# Patient Record
Sex: MALE | Race: BLACK OR AFRICAN AMERICAN | Employment: FULL TIME | URBAN - METROPOLITAN AREA
[De-identification: names, ages, dates, MRNs, and addresses within clinical notes are randomized per-mention and may not be internally consistent; named-entity substitution may affect disease eponyms.]

---

## 2023-10-14 ENCOUNTER — APPOINTMENT (OUTPATIENT)
Dept: CT IMAGING | Facility: HOSPITAL | Age: 63
DRG: 065 | End: 2023-10-14
Payer: COMMERCIAL

## 2023-10-14 ENCOUNTER — HOSPITAL ENCOUNTER (INPATIENT)
Facility: HOSPITAL | Age: 63
LOS: 2 days | Discharge: NURSING, REHAB OR LONG-TERM CARE | DRG: 065 | End: 2023-10-17
Attending: EMERGENCY MEDICINE | Admitting: HOSPITALIST
Payer: COMMERCIAL

## 2023-10-14 ENCOUNTER — APPOINTMENT (OUTPATIENT)
Dept: CT IMAGING | Facility: HOSPITAL | Age: 63
DRG: 065 | End: 2023-10-14
Attending: EMERGENCY MEDICINE
Payer: COMMERCIAL

## 2023-10-14 DIAGNOSIS — M48.02 CERVICAL STENOSIS OF SPINAL CANAL: ICD-10-CM

## 2023-10-14 DIAGNOSIS — G45.9 BRAIN TIA: Primary | ICD-10-CM

## 2023-10-14 LAB
ANION GAP SERPL CALC-SCNC: 8 MMOL/L (ref 0–18)
BASOPHILS # BLD AUTO: 0.05 X10(3) UL (ref 0–0.2)
BASOPHILS NFR BLD AUTO: 0.7 %
BUN BLD-MCNC: 13 MG/DL (ref 7–18)
BUN/CREAT SERPL: 11.4 (ref 10–20)
CALCIUM BLD-MCNC: 9.6 MG/DL (ref 8.5–10.1)
CHLORIDE SERPL-SCNC: 104 MMOL/L (ref 98–112)
CO2 SERPL-SCNC: 31 MMOL/L (ref 21–32)
CREAT BLD-MCNC: 1.14 MG/DL
DEPRECATED RDW RBC AUTO: 46.3 FL (ref 35.1–46.3)
EGFRCR SERPLBLD CKD-EPI 2021: 71 ML/MIN/1.73M2 (ref 60–?)
EOSINOPHIL # BLD AUTO: 0.09 X10(3) UL (ref 0–0.7)
EOSINOPHIL NFR BLD AUTO: 1.3 %
ERYTHROCYTE [DISTWIDTH] IN BLOOD BY AUTOMATED COUNT: 13.8 % (ref 11–15)
GLUCOSE BLD-MCNC: 140 MG/DL (ref 70–99)
GLUCOSE BLDC GLUCOMTR-MCNC: 128 MG/DL (ref 70–99)
HCT VFR BLD AUTO: 44 %
HGB BLD-MCNC: 14.2 G/DL
IMM GRANULOCYTES # BLD AUTO: 0.02 X10(3) UL (ref 0–1)
IMM GRANULOCYTES NFR BLD: 0.3 %
INR BLD: 0.93 (ref 0.8–1.2)
LYMPHOCYTES # BLD AUTO: 3.08 X10(3) UL (ref 1–4)
LYMPHOCYTES NFR BLD AUTO: 45.4 %
MCH RBC QN AUTO: 29.1 PG (ref 26–34)
MCHC RBC AUTO-ENTMCNC: 32.3 G/DL (ref 31–37)
MCV RBC AUTO: 90.2 FL
MONOCYTES # BLD AUTO: 0.56 X10(3) UL (ref 0.1–1)
MONOCYTES NFR BLD AUTO: 8.2 %
NEUTROPHILS # BLD AUTO: 2.99 X10 (3) UL (ref 1.5–7.7)
NEUTROPHILS # BLD AUTO: 2.99 X10(3) UL (ref 1.5–7.7)
NEUTROPHILS NFR BLD AUTO: 44.1 %
OSMOLALITY SERPL CALC.SUM OF ELEC: 298 MOSM/KG (ref 275–295)
PLATELET # BLD AUTO: 216 10(3)UL (ref 150–450)
POTASSIUM SERPL-SCNC: 3.3 MMOL/L (ref 3.5–5.1)
PROTHROMBIN TIME: 13.1 SECONDS (ref 11.6–14.8)
RBC # BLD AUTO: 4.88 X10(6)UL
SODIUM SERPL-SCNC: 143 MMOL/L (ref 136–145)
WBC # BLD AUTO: 6.8 X10(3) UL (ref 4–11)

## 2023-10-14 PROCEDURE — 70498 CT ANGIOGRAPHY NECK: CPT | Performed by: EMERGENCY MEDICINE

## 2023-10-14 PROCEDURE — 70450 CT HEAD/BRAIN W/O DYE: CPT

## 2023-10-14 PROCEDURE — 70496 CT ANGIOGRAPHY HEAD: CPT | Performed by: EMERGENCY MEDICINE

## 2023-10-15 ENCOUNTER — APPOINTMENT (OUTPATIENT)
Dept: CT IMAGING | Facility: HOSPITAL | Age: 63
DRG: 065 | End: 2023-10-15
Attending: Other
Payer: COMMERCIAL

## 2023-10-15 ENCOUNTER — APPOINTMENT (OUTPATIENT)
Dept: CV DIAGNOSTICS | Facility: HOSPITAL | Age: 63
DRG: 065 | End: 2023-10-15
Attending: Other
Payer: COMMERCIAL

## 2023-10-15 ENCOUNTER — APPOINTMENT (OUTPATIENT)
Dept: MRI IMAGING | Facility: HOSPITAL | Age: 63
DRG: 065 | End: 2023-10-15
Attending: EMERGENCY MEDICINE
Payer: COMMERCIAL

## 2023-10-15 PROBLEM — I63.9 ACUTE STROKE DUE TO ISCHEMIA (HCC): Status: ACTIVE | Noted: 2023-10-15

## 2023-10-15 PROBLEM — G45.9 BRAIN TIA: Status: ACTIVE | Noted: 2023-10-15

## 2023-10-15 PROBLEM — I63.59 ISCHEMIC CEREBRAL STROKE DUE TO INTRACRANIAL LARGE ARTERY ATHEROSCLEROSIS (HCC): Status: ACTIVE | Noted: 2023-10-15

## 2023-10-15 LAB
ANTIBODY SCREEN: NEGATIVE
ATRIAL RATE: 91 BPM
CHOLEST SERPL-MCNC: 159 MG/DL (ref ?–200)
ETHANOL SERPL-MCNC: <3 MG/DL (ref ?–3)
GLUCOSE BLDC GLUCOMTR-MCNC: 110 MG/DL (ref 70–99)
GLUCOSE BLDC GLUCOMTR-MCNC: 132 MG/DL (ref 70–99)
GLUCOSE BLDC GLUCOMTR-MCNC: 151 MG/DL (ref 70–99)
GLUCOSE BLDC GLUCOMTR-MCNC: 157 MG/DL (ref 70–99)
GLUCOSE BLDC GLUCOMTR-MCNC: 181 MG/DL (ref 70–99)
HDLC SERPL-MCNC: 37 MG/DL (ref 40–59)
LDLC SERPL CALC-MCNC: 94 MG/DL (ref ?–100)
NONHDLC SERPL-MCNC: 122 MG/DL (ref ?–130)
P AXIS: 54 DEGREES
P-R INTERVAL: 154 MS
Q-T INTERVAL: 402 MS
QRS DURATION: 88 MS
QTC CALCULATION (BEZET): 494 MS
R AXIS: -46 DEGREES
RH BLOOD TYPE: POSITIVE
RH BLOOD TYPE: POSITIVE
T AXIS: 55 DEGREES
TRIGL SERPL-MCNC: 160 MG/DL (ref 30–149)
VENTRICULAR RATE: 91 BPM
VLDLC SERPL CALC-MCNC: 26 MG/DL (ref 0–30)

## 2023-10-15 PROCEDURE — 93306 TTE W/DOPPLER COMPLETE: CPT | Performed by: OTHER

## 2023-10-15 PROCEDURE — 70551 MRI BRAIN STEM W/O DYE: CPT | Performed by: EMERGENCY MEDICINE

## 2023-10-15 PROCEDURE — 70496 CT ANGIOGRAPHY HEAD: CPT | Performed by: OTHER

## 2023-10-15 PROCEDURE — 3E033XZ INTRODUCTION OF VASOPRESSOR INTO PERIPHERAL VEIN, PERCUTANEOUS APPROACH: ICD-10-PCS | Performed by: OTHER

## 2023-10-15 PROCEDURE — 70498 CT ANGIOGRAPHY NECK: CPT | Performed by: OTHER

## 2023-10-15 PROCEDURE — 05HD33Z INSERTION OF INFUSION DEVICE INTO RIGHT CEPHALIC VEIN, PERCUTANEOUS APPROACH: ICD-10-PCS | Performed by: OTHER

## 2023-10-15 RX ORDER — LABETALOL HYDROCHLORIDE 5 MG/ML
10 INJECTION, SOLUTION INTRAVENOUS EVERY 10 MIN PRN
Status: DISCONTINUED | OUTPATIENT
Start: 2023-10-15 | End: 2023-10-17

## 2023-10-15 RX ORDER — BISACODYL 10 MG
10 SUPPOSITORY, RECTAL RECTAL
Status: DISCONTINUED | OUTPATIENT
Start: 2023-10-15 | End: 2023-10-17

## 2023-10-15 RX ORDER — ONDANSETRON 2 MG/ML
4 INJECTION INTRAMUSCULAR; INTRAVENOUS EVERY 6 HOURS PRN
Status: DISCONTINUED | OUTPATIENT
Start: 2023-10-15 | End: 2023-10-17

## 2023-10-15 RX ORDER — ONDANSETRON 2 MG/ML
4 INJECTION INTRAMUSCULAR; INTRAVENOUS EVERY 6 HOURS PRN
Status: DISCONTINUED | OUTPATIENT
Start: 2023-10-15 | End: 2023-10-15

## 2023-10-15 RX ORDER — HEPARIN SODIUM 5000 [USP'U]/ML
5000 INJECTION, SOLUTION INTRAVENOUS; SUBCUTANEOUS EVERY 8 HOURS SCHEDULED
Status: DISCONTINUED | OUTPATIENT
Start: 2023-10-15 | End: 2023-10-17

## 2023-10-15 RX ORDER — NICOTINE POLACRILEX 4 MG
15 LOZENGE BUCCAL
Status: DISCONTINUED | OUTPATIENT
Start: 2023-10-15 | End: 2023-10-17

## 2023-10-15 RX ORDER — DEXTROSE MONOHYDRATE 25 G/50ML
50 INJECTION, SOLUTION INTRAVENOUS
Status: DISCONTINUED | OUTPATIENT
Start: 2023-10-15 | End: 2023-10-17

## 2023-10-15 RX ORDER — SODIUM CHLORIDE 9 MG/ML
INJECTION, SOLUTION INTRAVENOUS CONTINUOUS
Status: DISCONTINUED | OUTPATIENT
Start: 2023-10-15 | End: 2023-10-15

## 2023-10-15 RX ORDER — ENEMA 19; 7 G/133ML; G/133ML
1 ENEMA RECTAL ONCE AS NEEDED
Status: DISCONTINUED | OUTPATIENT
Start: 2023-10-15 | End: 2023-10-17

## 2023-10-15 RX ORDER — ASPIRIN 325 MG
325 TABLET ORAL DAILY
Status: DISCONTINUED | OUTPATIENT
Start: 2023-10-15 | End: 2023-10-17

## 2023-10-15 RX ORDER — HYDRALAZINE HYDROCHLORIDE 20 MG/ML
10 INJECTION INTRAMUSCULAR; INTRAVENOUS EVERY 2 HOUR PRN
Status: DISCONTINUED | OUTPATIENT
Start: 2023-10-15 | End: 2023-10-17

## 2023-10-15 RX ORDER — CHLORTHALIDONE 25 MG/1
25 TABLET ORAL DAILY
COMMUNITY

## 2023-10-15 RX ORDER — ATORVASTATIN CALCIUM 20 MG/1
20 TABLET, FILM COATED ORAL NIGHTLY
COMMUNITY
End: 2023-10-17

## 2023-10-15 RX ORDER — METOCLOPRAMIDE HYDROCHLORIDE 5 MG/ML
10 INJECTION INTRAMUSCULAR; INTRAVENOUS EVERY 8 HOURS PRN
Status: DISCONTINUED | OUTPATIENT
Start: 2023-10-15 | End: 2023-10-15

## 2023-10-15 RX ORDER — ACETAMINOPHEN 500 MG
500 TABLET ORAL EVERY 4 HOURS PRN
Status: DISCONTINUED | OUTPATIENT
Start: 2023-10-15 | End: 2023-10-17

## 2023-10-15 RX ORDER — POLYETHYLENE GLYCOL 3350 17 G/17G
17 POWDER, FOR SOLUTION ORAL DAILY PRN
Status: DISCONTINUED | OUTPATIENT
Start: 2023-10-15 | End: 2023-10-17

## 2023-10-15 RX ORDER — HEPARIN SODIUM 5000 [USP'U]/ML
5000 INJECTION, SOLUTION INTRAVENOUS; SUBCUTANEOUS EVERY 8 HOURS SCHEDULED
Status: DISCONTINUED | OUTPATIENT
Start: 2023-10-15 | End: 2023-10-15

## 2023-10-15 RX ORDER — CLOPIDOGREL BISULFATE 75 MG/1
75 TABLET ORAL ONCE
Status: COMPLETED | OUTPATIENT
Start: 2023-10-15 | End: 2023-10-15

## 2023-10-15 RX ORDER — CLOPIDOGREL BISULFATE 75 MG/1
75 TABLET ORAL DAILY
Status: DISCONTINUED | OUTPATIENT
Start: 2023-10-16 | End: 2023-10-17

## 2023-10-15 RX ORDER — METOCLOPRAMIDE HYDROCHLORIDE 5 MG/ML
10 INJECTION INTRAMUSCULAR; INTRAVENOUS EVERY 8 HOURS PRN
Status: DISCONTINUED | OUTPATIENT
Start: 2023-10-15 | End: 2023-10-17

## 2023-10-15 RX ORDER — ATORVASTATIN CALCIUM 80 MG/1
80 TABLET, FILM COATED ORAL NIGHTLY
Status: DISCONTINUED | OUTPATIENT
Start: 2023-10-15 | End: 2023-10-17

## 2023-10-15 RX ORDER — SENNOSIDES 8.6 MG
17.2 TABLET ORAL NIGHTLY PRN
Status: DISCONTINUED | OUTPATIENT
Start: 2023-10-15 | End: 2023-10-17

## 2023-10-15 RX ORDER — CLOPIDOGREL BISULFATE 75 MG/1
225 TABLET ORAL ONCE
Status: COMPLETED | OUTPATIENT
Start: 2023-10-15 | End: 2023-10-15

## 2023-10-15 RX ORDER — SODIUM CHLORIDE 9 MG/ML
INJECTION, SOLUTION INTRAVENOUS CONTINUOUS
Status: DISCONTINUED | OUTPATIENT
Start: 2023-10-15 | End: 2023-10-16

## 2023-10-15 RX ORDER — ASPIRIN 300 MG/1
300 SUPPOSITORY RECTAL DAILY
Status: DISCONTINUED | OUTPATIENT
Start: 2023-10-15 | End: 2023-10-17

## 2023-10-15 RX ORDER — NICOTINE POLACRILEX 4 MG
30 LOZENGE BUCCAL
Status: DISCONTINUED | OUTPATIENT
Start: 2023-10-15 | End: 2023-10-17

## 2023-10-15 RX ORDER — ACETAMINOPHEN 650 MG/1
650 SUPPOSITORY RECTAL EVERY 4 HOURS PRN
Status: DISCONTINUED | OUTPATIENT
Start: 2023-10-15 | End: 2023-10-17

## 2023-10-15 RX ORDER — ACETAMINOPHEN 325 MG/1
650 TABLET ORAL EVERY 4 HOURS PRN
Status: DISCONTINUED | OUTPATIENT
Start: 2023-10-15 | End: 2023-10-17

## 2023-10-15 RX ORDER — ASPIRIN 81 MG/1
324 TABLET, CHEWABLE ORAL ONCE
Status: COMPLETED | OUTPATIENT
Start: 2023-10-15 | End: 2023-10-15

## 2023-10-15 NOTE — PLAN OF CARE
Patient is A&Ox4, on room air. Norepinephrine gtt started. Wife at bedside. Left facial droop, some effort against gravity with Left leg, and unable to move Left arm. Bed is locked and in lowest position. Safety measures maintained. Call light is within reach.   Problem: Patient Centered Care  Goal: Patient preferences are identified and integrated in the patient's plan of care  Description: Interventions:  - What would you like us to know as we care for you?   - Provide timely, complete, and accurate information to patient/family  - Incorporate patient and family knowledge, values, beliefs, and cultural backgrounds into the planning and delivery of care  - Encourage patient/family to participate in care and decision-making at the level they choose  - Honor patient and family perspectives and choices  Outcome: Progressing     Problem: Patient/Family Goals  Goal: Patient/Family Long Term Goal  Description: Patient's Long Term Goal: to go home    Interventions:  - follow MD orders  - follow medication regimen  - See additional Care Plan goals for specific interventions  Outcome: Progressing  Goal: Patient/Family Short Term Goal  Description: Patient's Short Term Goal:     Interventions:   -   - See additional Care Plan goals for specific interventions  Outcome: Progressing

## 2023-10-15 NOTE — ED INITIAL ASSESSMENT (HPI)
Pt from home to ED via triage for evaluation of left sided weakness. Onset 1 hr PTA to ED. Pt reports left arm weakness and balance being off.

## 2023-10-15 NOTE — PROGRESS NOTES
Brief neurology progress note    No improvement with pressors/blood pressure augmentation. Recommend discontinuing norepinephrine. Once he is off norepinephrine then can transfer back to the floor. q4-hour neurochecks. q4 hour vitals. Patient will be seen by neurosurgery tomorrow. Already approved by Skip Orellana. Would like neurosurgery evaluation before he goes to hospitals. may be able to go to hospitals tomorrow. Okay for acute rehab per neurology.     Tana Wise,   Staff Vascular & General Neurology

## 2023-10-15 NOTE — ED QUICK NOTES
Orders for admission, patient is aware of plan and ready to go upstairs. Any questions, please call ED RN logan at extension [phone].      Patient Covid vaccination status: Unvaccinated     COVID Test Ordered in ED: None    COVID Suspicion at Admission: N/A    Running Infusions:  None    Mental Status/LOC at time of transport: alert    Other pertinent information:   CIWA score: N/A   NIH score:  0

## 2023-10-15 NOTE — PROGRESS NOTES
Brief neurology pn     Patient initially presented with left hemiparesis and hemisensory loss that completely resolved. His symptoms fluctuated and returned in the emergency department. Per discussion with the ED attending the patient presented with symptoms that had resolved and then spontaneously returned. He had a left facial droop and left pronator drift. Tenecteplase was discussed but his NIH was a 2 and the patient declined treatment tenecteplase. He then had an MRI of the brain, see below, which demonstrated a complete left periventricular stroke, based on location and morphology most likely due to small vessel disease. Patient's left hemiparesis (brachial >crural) worsened/continued to progress at 4:35 AM.  Spoke with RN. Systolic blood pressures in the 140s. Started patient on normal saline at 125. Blood sugar is euglycemic and greater than 100. Recommended stat CTA head and neck, but prior CTA without any LVO involving his right MCA territory. Not a candidate for thrombectomy because no large vessel occlusion. Appears patient is having a striatocapsular stroke due to small vessel disease. In these strokes a branch vessel off the parent vessel (right MCA) has been occluded. There may be variable flow leading to fluctuating deficits. Or, his stroke may gradually be expanding. Unfortunately there are no interventions available and the strokes can spontaneously fluctuate. This is the natural history of the disease. Spoke with the emergency department attending. tenecteplase was discussed with the patient when he declined in the ER. Per their discussion the patient declined treatment with tenecteplase at that time because his NIH was a 2. We will follow-up CTA. We will transfer to the unit for a trial of pressors and closer monitoring to see if he has any improvement with higher blood pressures. Full consult to follow.     Melody Lebron DO  Staff Vascular & General Neurology

## 2023-10-15 NOTE — SLP NOTE
ADULT SWALLOWING EVALUATION    ASSESSMENT    ASSESSMENT/OVERALL IMPRESSION:  PPE REQUIRED. THIS THERAPIST WORE GLOVES, DROPLET MASK, AND GOGGLES FOR DURATION OF EVALUATION. HANDS WASHED UPON ENTRANCE/EXIT. SLP BSSE orders received and acknowledged. A swallow evaluation warranted secondary to stroke protocol. Pt afebrile with clear vocal quality, on room air, with oxygen saturation at 98. Pt with no prior hx of dysphagia at [address] [place] [address]. Pt positioned upright in bed. Pt with no complaints of pain. Oral [facility] exam completed and L sided facial droop with L sided facial/lingual weakness observed. Pt with adequate oral acceptance. Intermittent anterior spillage from L side of mouth with all trials. Pt with intact bite, mastication of solids, and slightly increased JAZMYNE. Pt's swallow response appears delayed with reduced hyolaryngeal elevation/excursion. No clinical signs of aspiration (e.g., immediate/delayed throat clear, immediate/delayed cough, wet vocal quality, increased O2 effort) observed across all trials of mildly thick liquids, puree, and hard solids. Overt signs/symptoms of aspiration observed with thin liquids as evidenced by throat clearing and wet vocal quality. Pt expressed thin liquids feel \"fast\" going down. Oral/buccal cavities clear of residue following all trials. Oxygen status remained >96 t/o the entire evaluation. At this time, pt presents with mild oral dysphagia and probable pharyngeal dysfunction. Recommend a regular diet and mildly thick liquids with strict adherence to safe swallowing compensatory strategies. Results and recommendations reviewed with RN and pt. Pt v/u to all results/recommendations. Recommendations remain written on whiteboard. SLP collaborated with RN for MD diet orders. PLAN: SLP to f/u x3 meal assessments, monitor imaging, VFSS if clinically warranted, and SLE in future session.      RECOMMENDATIONS   Diet Recommendations - Solids: Regular  Diet Recommendations - Liquids: Nectar thick liquids/ Mildly thick      Compensatory Strategies Recommended: No straws; Slow rate; Alternate consistencies;Small bites and sips  Aspiration Precautions: Upright position; Slow rate;Small bites and sips; No straw  Medication Administration Recommendations: Present with thickened liquid; One pill at a time  Treatment Plan/Recommendations: Aspiration precautions  Discharge Recommendations/Plan: Undetermined    HISTORY   MEDICAL HISTORY  Reason for Referral: Stroke protocol    Problem List  Principal Problem:    Ischemic cerebral stroke due to intracranial large artery atherosclerosis ([facility] [address]  Active Problems:    Acute stroke due to ischemia [facility])      Past Medical History  Past Medical History:   Diagnosis Date    High blood pressure     High cholesterol     Prediabetes        Prior Living Situation: Home with spouse  Diet Prior to Admission: Regular; Thin liquids  Precautions: Aspiration    Patient/Family Goals: Pt expressed he wants to live    SWALLOWING HISTORY  Current Diet Consistency: NPO  Dysphagia History: None at [facility]    Imaging Results:     MRI BRAIN 10/15/23:  CONCLUSION:   1. Acute 2 cm deep white matter periventricular infarct along body of right lateral ventricle. Findings are in a small vessel disease distribution. 2. Mild changes of chronic small vessel disease in cerebral white matter. 3. 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 Please see CTA for further details. Dictated by (CST): Ofelia Ashley MD on 10/15/2023 at 11:10 AM       Finalized by (CST): Ofelia Ashley MD on 10/15/2023 at 11:16 AM     OBJECTIVE   ORAL MOTOR EXAMINATION  Dentition: Natural;Functional  Symmetry: Reduced left facial  Strength: Reduced left facial;Reduced left lingual     Range of Motion: Within Functional Limits  Rate of Motion: Reduced    Voice Quality: Clear  Respiratory Status: Unlabored  Consistencies Trialed:  Thin liquids; Nectar thick liquids;Puree;Hard solid  Method of Presentation: Self presentation;Spoon;Cup;Single sips  Patient Positioning: Upright;Midline    Oral Phase of Swallow: Impaired  Bolus Retrieval: Intact  Bilabial Seal: Impaired  Bolus Formation: Impaired  Bolus Propulsion: Impaired  Mastication: Intact       Pharyngeal Phase of Swallow: Impaired  Laryngeal Elevation Timing: Appears impaired  Laryngeal Elevation Strength: Appears impaired     (Please note: Silent aspiration cannot be evaluated clinically. Videofluoroscopic Swallow Study is required to rule-out silent aspiration.)    Esophageal Phase of Swallow: No complaints consistent with possible esophageal involvement    GOALS  Goal #1 The patient will tolerate regular consistency and mildly thick liquids without overt signs or symptoms of aspiration with 100 % accuracy over 1-2 session(s). In Progress   Goal #2 The patient/family/caregiver will demonstrate understanding and implementation of aspiration precautions and swallow strategies independently over 3 session(s). In Progress   Goal #3 The patient will tolerate trial upgrade of regular consistency and thin liquids without overt signs or symptoms of aspiration with 100 % accuracy over 2-3 session(s). In Progress   Goal #4 SLE in future session. In Progress     FOLLOW UP  Treatment Plan/Recommendations: Aspiration precautions  Number of Visits to Meet Established Goals: 3  Follow Up Needed (Documentation Required): Yes  SLP Follow-up Date: 10/16/23    Thank you for your referral.   If you have any questions, please contact KRAEN Peterson Sparks  Speech Language Pathologist  Phone Number Ext. [phone]

## 2023-10-15 NOTE — CM/SW NOTE
MDO Likely needs Acute rehab, was inquiring about rehab in [facility] where his wife lives       PT/OT eval is pending. SW spoke with patient, introduced self and role. Patient alert and oriented at time of assessment. Patient lives with wife at (address correct on face sheet- [place], [place]). Prior to hospitalization, patient independent with ADL's. Patient is aware that he will need rehab and is in agreement, states that after speaking with the medical team, he would like to go to rehab closer to the hospital. SW discussed AR placement process, patient expressed understanding. Patient would like to look at a list of local AR options. SW to provide when avail.        PLAN: Pending PT/OT eval. Will need PMR if rec is Acute Rehab, Referrals pending in [place] for acute rehab, need to follow up with choice list*    Dano Mejia, MSW, LSW    [id number]

## 2023-10-15 NOTE — PLAN OF CARE
NIH 9 on daily scale, no changes from prior assessment, no changes with higher BP and increased perfusion with levo, left arm spasms with non voluntary movement, limited movement in left leg, full movement on right extremities, placed midline, pt tearful, consulted spiritual care, speech approved for diet, had issues with thin liquids so placed on thickened liquids with no straw, echo normal with EF of 60%-65%, aspirin and Plavix given, will start atorvastatin tonight,  levophed discontinued per neuro MD Wor[place], no further neuro interventions, pt approved [facility] acute rehab where  sent referral, consult to neurosurgery for mass on neck that will be addressed outpatient      Problem: Patient Centered Care  Goal: Patient preferences are identified and integrated in the patient's plan of care  Description: Interventions:  - What would you like us to know as we care for you?   - Provide timely, complete, and accurate information to patient/family  - Incorporate patient and family knowledge, values, beliefs, and cultural backgrounds into the planning and delivery of care  - Encourage patient/family to participate in care and decision-making at the level they choose  - Honor patient and family perspectives and choices  Outcome: Progressing

## 2023-10-15 NOTE — ED QUICK NOTES
Pt refusing \" clot busting medication\". Risks and benefits explained per Dr Franklin Schneider    Time gave to reconsider.

## 2023-10-15 NOTE — PROGRESS NOTES
10/15/23 0948   Clinical Encounter Type   Visited With Patient not available;Health care provider   Routine Visit Follow-up   Crisis Visit Critical care   Referral From Nurse   Family Spiritual Encounters   Family Participation in Care Often     Summary: received consult for family support. Writer attempted to visit with patient but they were busy with staff. Spiritual Care support can be requested via an Epic consult.    -Charlette Roberts.

## 2023-10-15 NOTE — ED QUICK NOTES
Family at bedside. C/O intermittent \" feels like my left side of my face is not moving\". No changes in neuro status. Waiting results.

## 2023-10-15 NOTE — PROGRESS NOTES
10/15/23 1622   Clinical Encounter Type   Visited With Patient   Routine Visit Follow-up   Crisis Visit Critical care   Referral From Nurse   Referral To    Restorationist Encounters   Spiritual Requests During Visit / Hospitalization Declines  Visit   Family Spiritual Encounters   Family Support During Treatment Often     Summary: received consult for visit. Writer visited with patient, seated in bed waiting for their meal. Patient requested  return another day for a visit.  Spiritual Care support can be requested via an [facility] consult.     -Beck Hammer

## 2023-10-16 PROBLEM — G45.9 BRAIN TIA: Status: ACTIVE | Noted: 2023-10-16

## 2023-10-16 PROBLEM — G93.89: Status: ACTIVE | Noted: 2023-10-16

## 2023-10-16 LAB
ANION GAP SERPL CALC-SCNC: 5 MMOL/L (ref 0–18)
BASOPHILS # BLD AUTO: 0.03 X10(3) UL (ref 0–0.2)
BASOPHILS NFR BLD AUTO: 0.5 %
BUN BLD-MCNC: 9 MG/DL (ref 7–18)
BUN/CREAT SERPL: 10.6 (ref 10–20)
CALCIUM BLD-MCNC: 8.5 MG/DL (ref 8.5–10.1)
CHLORIDE SERPL-SCNC: 109 MMOL/L (ref 98–112)
CO2 SERPL-SCNC: 29 MMOL/L (ref 21–32)
CREAT BLD-MCNC: 0.85 MG/DL
DEPRECATED RDW RBC AUTO: 47.2 FL (ref 35.1–46.3)
EGFRCR SERPLBLD CKD-EPI 2021: 96 ML/MIN/1.73M2 (ref 60–?)
EOSINOPHIL # BLD AUTO: 0.07 X10(3) UL (ref 0–0.7)
EOSINOPHIL NFR BLD AUTO: 1.1 %
ERYTHROCYTE [DISTWIDTH] IN BLOOD BY AUTOMATED COUNT: 13.9 % (ref 11–15)
GLUCOSE BLD-MCNC: 132 MG/DL (ref 70–99)
HCT VFR BLD AUTO: 41 %
HGB BLD-MCNC: 13 G/DL
IMM GRANULOCYTES # BLD AUTO: 0.01 X10(3) UL (ref 0–1)
IMM GRANULOCYTES NFR BLD: 0.2 %
LYMPHOCYTES # BLD AUTO: 2.26 X10(3) UL (ref 1–4)
LYMPHOCYTES NFR BLD AUTO: 36.5 %
MAGNESIUM SERPL-MCNC: 1.7 MG/DL (ref 1.6–2.6)
MCH RBC QN AUTO: 29.1 PG (ref 26–34)
MCHC RBC AUTO-ENTMCNC: 31.7 G/DL (ref 31–37)
MCV RBC AUTO: 91.7 FL
MONOCYTES # BLD AUTO: 0.39 X10(3) UL (ref 0.1–1)
MONOCYTES NFR BLD AUTO: 6.3 %
NEUTROPHILS # BLD AUTO: 3.44 X10 (3) UL (ref 1.5–7.7)
NEUTROPHILS # BLD AUTO: 3.44 X10(3) UL (ref 1.5–7.7)
NEUTROPHILS NFR BLD AUTO: 55.4 %
OSMOLALITY SERPL CALC.SUM OF ELEC: 297 MOSM/KG (ref 275–295)
PLATELET # BLD AUTO: 171 10(3)UL (ref 150–450)
POTASSIUM SERPL-SCNC: 3.1 MMOL/L (ref 3.5–5.1)
POTASSIUM SERPL-SCNC: 3.1 MMOL/L (ref 3.5–5.1)
POTASSIUM SERPL-SCNC: 3.3 MMOL/L (ref 3.5–5.1)
RBC # BLD AUTO: 4.47 X10(6)UL
SODIUM SERPL-SCNC: 143 MMOL/L (ref 136–145)
WBC # BLD AUTO: 6.2 X10(3) UL (ref 4–11)

## 2023-10-16 PROCEDURE — 99253 IP/OBS CNSLTJ NEW/EST LOW 45: CPT | Performed by: NEUROLOGICAL SURGERY

## 2023-10-16 RX ORDER — MAGNESIUM OXIDE 400 MG/1
400 TABLET ORAL ONCE
Status: COMPLETED | OUTPATIENT
Start: 2023-10-16 | End: 2023-10-16

## 2023-10-16 RX ORDER — POTASSIUM CHLORIDE 20 MEQ/1
40 TABLET, EXTENDED RELEASE ORAL EVERY 4 HOURS
Status: COMPLETED | OUTPATIENT
Start: 2023-10-16 | End: 2023-10-16

## 2023-10-16 NOTE — PHYSICAL THERAPY NOTE
PHYSICAL THERAPY EVALUATION - INPATIENT     Room Number: 499/393-B  Evaluation Date: 10/16/2023  Type of Evaluation: Initial   Physician Order: PT Eval and Treat    Presenting Problem: L sided weakness CVA  Reason for Therapy: Mobility Dysfunction and Discharge Planning    PHYSICAL THERAPY ASSESSMENT   Orders received and chart reviewed. ALESHA Arvizu approved participation in physical therapy. Session coordinated with OT, gait belt donned. PPE worn by therapist: mask and gloves. Patient was not wearing a mask during session. Patient is a 70year old male admitted 10/14/2023 for Presenting Problem: L sided weakness CVA. Patient presented in bed with 0/10 pain. Education provided on Physical therapy plan of care and physiological benefits of out of bed mobility. Patient with good carryover. Patient with little to no movement noted in left leg, patient with 2/5 muscle activation in left adductors of thigh. This appears to be voluntary movement, tone noted when patient standing. Patient agreeable to transfer to bedside chair. Patient with mod A x 1 for bed mobility. Patient with max A x 2 for stand pivot transfer to bedside chair. Patient able to stand from bedside chair with max A x 2, assist provided to hold left hand on rolling walker. Patient also with tactile facilitation at left leg while weight shifting from left to right for about 1 minutes with mod to max A x 2. Patient with no buckling noted due to facilitation and does not appear to be hyperextending, may benefit from knee immobilizer for left leg. Patient is currently functioning below baseline with bed mobility, transfers, gait, and stair negotiation as a result of the following impairments: decreased functional strength, impaired coordination, impaired motor planning, and medical status. Next session anticipate to progress bed mobility, transfers, gait, and stair negotiation. Oxygen sat 97% and heart rate 83 on room air. Blood pressure 156/81.  Oxygen sat at end of session 97% and heart rate 72. Patient history and/or personal factors that may impact the plan of care include home accessibility concerns. Based on the physical therapy examination of the noted systems and functional activity/participation limitations, the patient presentation is unstable given the patient demonstrates worsening of previously stable condition and demonstrates worsening of co-morbidities. Patient would benefit from continued skilled physical therapy interventions to maximize patient safety and functional independence. Updated nurse on results of session, patient left in bedside chair, all lines intact, all needs met with call light in reach. Bed mobility: Mod assist  Transfers: Max assist and assist of 2  Gait Assistance: Not tested                   Patient was left in bedside chair at end of session with all needs in reach. Patient's current functional deficits include bed mobility, transfers, gait and stair navigation. Patient does not have the physical skills to return to prior living environment upon D/C from the hospital. Anticipate patient will benefit from continued skilled physical therapy while in the hospital and upon D/C from the hospital at acute rehab facility. The patient's Approx Degree of Impairment: 72.57% has been calculated based on documentation in the [facility] '6 clicks' Inpatient Basic Mobility Short Form. Research supports that patients with this level of impairment may benefit from Acute Rehab. RN aware of patient status post session. Patient will benefit from continued IP PT services to address these deficits in preparation for discharge. 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    Past Medical History  Past Medical History:   Diagnosis Date    High blood pressure     High cholesterol     Prediabetes      Past Surgical History  Past Surgical History:   Procedure Laterality Date    CERVICAL SPINE SURGERY      VASECTOMY       HOME SITUATION  Type of Home: House   Home Layout: One level  Stairs to Enter : 0  Railing: No  Stairs to Bedroom: 0  Railing: No  Lives With: Spouse  Drives: Yes  Patient Owned Equipment: None  Patient Regularly Uses: None    Prior Level of [place]: Patient reports being independent in ADL's and ambulation with no assistive device prior to admission to the hospital.     SUBJECTIVE  \"I was at dinner with my wife\"    PHYSICAL THERAPY EXAMINATION     OBJECTIVE  Precautions: Bed/chair alarm;Limb alert - left  Fall Risk: High fall risk    WEIGHT BEARING RESTRICTION  Weight Bearing Restriction: None                PAIN ASSESSMENT  Ratin          COGNITION  Overall Cognitive Status:  WFL - within functional limits    RANGE OF MOTION AND STRENGTH ASSESSMENT    Lower extremity ROM is within functional limits  RLE WNL    Lower extremity strength is within functional limits  RLE WNL    BALANCE  Static Sitting: Fair +  Dynamic Sitting: Fair  Static Standing: Not tested  Dynamic Standing: Not tested    AM-PAC '6-Clicks' INPATIENT SHORT FORM - BASIC MOBILITY  How much difficulty does the patient currently have. ..   Patient Difficulty: Turning over in bed (including adjusting bedclothes, sheets and blankets)?: A Lot   Patient Difficulty: Sitting down on and standing up from a chair with arms (e.g., wheelchair, bedside commode, etc.): A Lot   Patient Difficulty: Moving from lying on back to sitting on the side of the bed?: A Lot   How much help from another person does the patient currently need. .. Help from Another: Moving to and from a bed to a chair (including a wheelchair)?: A Lot   Help from Another: Need to walk in hospital room?: A Lot   Help from Another: Climbing 3-5 steps with a railing?: Total     AM-PAC Score:  Raw Score: 11   Approx Degree of Impairment: 72.57%   Standardized Score (AM-PAC Scale): 33.86   CMS Modifier (G-Code): CL    Exercise/Education Provided:  Bed mobility  Body mechanics  Transfer training    Patient End of Session: Up in chair;Needs met;Call light within reach;RN aware of session/findings; All patient questions and concerns addressed    CURRENT GOALS  Goals to be met by: 10/25/23  Patient Goal Patient's self-stated goal is: to go home   Goal #1 Patient is able to demonstrate supine - sit EOB @ level: minimum assistance     Goal #1   Current Status    Goal #2 Patient is able to demonstrate transfers Sit to/from Stand at assistance level: minimum assistance with walker - rolling     Goal #2  Current Status    Goal #3 Patient is able to ambulate 10 feet with assist device: walker - rolling at assistance level: minimum assistance   Goal #3   Current Status    Goal #4    Goal #4   Current Status    Goal #5 Patient to demonstrate independence with home activity/exercise instructions provided to patient in preparation for discharge.    Goal #5   Current Status    Goal #6    Goal #6  Current Status     Patient Evaluation Complexity Level:  History Moderate - 1 or 2 personal factors and/or co-morbidities   Examination of body systems Moderate - addressing a total of 3 or more elements   Clinical Presentation Moderate - Evolving   Clinical Decision Making Moderate Complexity     Therapeutic Activity: 13 minutes  Therapeutic Exercise: 10 minutes

## 2023-10-16 NOTE — DIABETES ED
[facility]   Diabetes Education Note      [address] Patient Status Inpatient   8/15/1960  MRN [id number]  Location  [facility] 2W/SW  Attending Debbie Mujica MD  Hospital Days # 1  PCP  None Pcp    Reason for Visit: Newly Diagnosed      HgbA1C (no units)   Date Value   10/16/2023      Comment:     Sent to [facility] for testing. Discussion:Order for new diagnosis of diabetes. No A1c value available. Patient and spouse indicates he has been told he has pre-diabetes however they are unsure of previous A1c value. He indicates he has eliminated regular lemonade from his meal planning. He typically consumes 3 meals per day and is aware of limiting portions of rice, potatoes and pasta. Reviewed meal planning for Diabetes located on the Diabetes education handouts. He places regular sugar in his coffee with breakfast ( 6 packets or 2 larger spoonfuls) daily. Encouraged him to limit or switch to sugar substitute for coffee in the morning. Discussed the benefits of glucose control to aid and prevent complications of diabetes. Reinforced the importance of regular follow up care in the outpatient setting. Declined education for use of glucometer and spouse confirmed she can assist as needed for monitoring blood sugar if he is directed upon discharge. Will continue to monitor and return as allowed prior to discharge.         Education provided:  Basic Diabetes pathophysiology  Basic Meal planning  Blood sugar target ranges  Benefits of physical activity for glucose control  Reinforced the importance of follow up care with outpatient medical team.      Ines Wise RN    10/16/2023  11:34 AM

## 2023-10-16 NOTE — PLAN OF CARE
Neuro status unchanged, L side hemiparesis  Tolerated pills with thickened water, no s/s of aspiration  Required assistance with turns but R side of body at baseline strength, PT/OT to see today      Problem: Patient Centered Care  Goal: Patient preferences are identified and integrated in the patient's plan of care  Description: Interventions:  - What would you like us to know as we care for you?  Is from out of town, here for work  - Provide timely, complete, and accurate information to patient/family  - Incorporate patient and family knowledge, values, beliefs, and cultural backgrounds into the planning and delivery of care  - Encourage patient/family to participate in care and decision-making at the level they choose  - Honor patient and family perspectives and choices  10/16/2023 0612 by Radha Crook RN  Outcome: Progressing  10/16/2023 0611 by Radha Crook RN  Outcome: Progressing     Problem: Patient/Family Goals  Goal: Patient/Family Long Term Goal  Description: Patient's Long Term Goal: regain function of L side    Interventions:  - discharge to acute rehab  - See additional Care Plan goals for specific interventions  10/16/2023 0612 by Radha Crook RN  Outcome: Progressing  10/16/2023 0611 by Radha Crook RN  Outcome: Progressing  Goal: Patient/Family Short Term Goal  Description: Patient's Short Term Goal: increased movement on L side and increased independence    Interventions:   - physical and occupational therapy consults  - See additional Care Plan goals for specific interventions  10/16/2023 0612 by Radha Crook RN  Outcome: Progressing  10/16/2023 0611 by Radha Crook RN  Outcome: Progressing     Problem: NEUROLOGICAL - ADULT  Goal: Achieves stable or improved neurological status  Description: INTERVENTIONS  - Assess for and report changes in neurological status  - Initiate measures to prevent increased intracranial pressure  - Maintain blood pressure and fluid volume within ordered parameters to optimize cerebral perfusion and minimize risk of hemorrhage  - Monitor temperature, glucose, and sodium.  Initiate appropriate interventions as ordered  Outcome: Progressing  Goal: Achieves maximal functionality and self care  Description: INTERVENTIONS  - Monitor swallowing and airway patency with patient fatigue and changes in neurological status  - Encourage and assist patient to increase activity and self care with guidance from PT/OT  - Encourage visually impaired, hearing impaired and aphasic patients to use assistive/communication devices  Outcome: Progressing

## 2023-10-16 NOTE — SLP NOTE
SPEECH DAILY NOTE - INPATIENT    ASSESSMENT & PLAN   ASSESSMENT  PPE REQUIRED. THIS THERAPIST WORE GLOVES, DROPLET MASK, AND GOGGLES FOR DURATION OF EVALUATION. HANDS WASHED UPON ENTRANCE/EXIT. SLP f/u for ongoing dysphagia tx/meal assessment per recommendations of regular/mildly thick per BSE. RN reports pt tolerates diet and medication well with no overt clinical s/s aspiration. Pt denies any swallowing challenges. Pt positioned upright in bedside chair with wife at bedside. Pt afebrile, tolerating room air with oxygen status 97 prior to the start of oral trials. SLP reviewed aspiration precautions and safe swallowing compensatory strategies with the patient. Safe swallow guidelines remain written on the white board in purple. Diet recommendations remain on the whiteboard in the room. Patient and family v/u. Provided no assistance, pt tolerates regular and mildly thick liquids via open cup with no overt clinical signs/symptoms of aspiration. Pt trialed with thin liquids and pt demonstrating slow/small sips and no CSA of aspiration for 100% of trials. Oxygen status remained >96 t/o the entire session. Respiratory Rate WFL. Oral/buccal cavities clear of residue following all trials. Pt upgraded to thin liquids (no straws). PLAN: SLP to f/u x1-2 meal assessments, monitor imaging, and VFSS if clinically warranted. Diet Recommendations - Solids: Regular  Diet Recommendations - Liquids: Thin Liquids    Compensatory Strategies Recommended: No straws; Slow rate; Alternate consistencies;Small bites and sips  Aspiration Precautions: Upright position; Slow rate;Small bites and sips; No straw  Medication Administration Recommendations: Present with thickened liquid; One pill at a time    Patient Experiencing Pain: No       Discharge Recommendations  Discharge Recommendations/Plan: Undetermined    Treatment Plan  Treatment Plan/Recommendations: Aspiration precautions; Dysarthria therapy    Interdisciplinary Communication: Discussed with RN  Recommendations posted at bedside      GOALS  Goal #1 The patient will tolerate regular consistency and mildly thick liquids without overt signs or symptoms of aspiration with 100 % accuracy over 1-2 session(s). Pt tolerates regular/mildly thick liquids with no CSA for 100% of trials. Upgraded to thin    The patient will tolerate regular consistency and thin thick liquids without overt signs or symptoms of aspiration with 100 % accuracy over 1-2 session(s). Goal initiated 10/16 Goal met/modified   Goal #2 The patient/family/caregiver will demonstrate understanding and implementation of aspiration precautions and swallow strategies independently over 3 session(s). Education provided regarding aspiration precautions and safe swallow strategies. Pt v/u to all recommendations. Continue. In Progress   Goal #3 The patient will tolerate trial upgrade of regular consistency and thin liquids without overt signs or symptoms of aspiration with 100 % accuracy over 2-3 session(s). Pt tolerated trials of thin liquids with no CSA for 100% of trials. Upgraded to thin liquids. Goal met    Goal #4 SLE in future session. Met Goal met     FOLLOW UP  Follow Up Needed (Documentation Required): Yes  SLP Follow-up Date: 10/17/23  Number of Visits to Meet Established Goals: 2    Session: 1 following BSE    If you have any questions, please contact Harry Dejesus, SLP    Hiral Lawrence  Speech Language Pathologist  Phone Number Ext. [phone]

## 2023-10-16 NOTE — SLP NOTE
SPEECH/LANGUAGE/COGNITIVE EVALUATION - INPATIENT    Admission Date: 10/14/2023  Evaluation Date: 10/16/23    Reason for Referral: Stroke protocol    ASSESSMENT & PLAN   ASSESSMENT & IMPRESSION  PPE REQUIRED. THIS THERAPIST WORE GLOVES, DROPLET MASK, AND GOGGLES FOR DURATION OF EVALUATION. HANDS WASHED UPON ENTRANCE/EXIT. In regards to S/L cognitive skills, pt reports slurred speech with difficulty articulating. Oral agility and verbal agility tasks appear impaired per informal measures. The SLUMS was administered in today's evaluation. Pt presents with intact receptive/expressive language skills characterized by his ability to respond to y/n questions, follow 1 step commands, follow written directions, provide correct responses to wh-questions following a short story, intact generative naming, and no evidence of word finding across conversational tasks. Pt oriented to person, time, place, and year. Pt exhibits overall intact immediate memory and short term memory skills as evidenced by his ability to recall 4/5 items post 3 minutes. Clock drawing appears functional with adequate placement of numbers and hands set to the correct time. PLAN: SLP to f/u to target OME/DDK exercises for increased speech intelligibility. Assessment(s) Administered: SLUMS;Perceptual Dysarthria Evaluation Rating   SLUMS: 29/30     Perceptual Dysarthria Evaluation Rating: informal          Deficits Identified: Motor speech    Discharge Recommendations/Plan: Undetermined    Patient Experiencing Pain: No    GOALS  Goal #1 The patient will complete OMEs targeting Lingual, Labial, and Buccal strength, ROM, and coordination with 90 % accuracy within 2 session(s). In Progress   Goal #2 Pt will complete DDK drills with 90% accuracy and minimal cues within 2 sessions. In Progress   Goal #3 SLOB strategy to be introduced and implemented with 90% accuracy and minimal cues within 2 sessions.    In Progress     Prior Living Situation: Home with spouse  Prior Level of Function: Independent      Imaging Results:     MRI BRAIN 10/15/23:  CONCLUSION:   1. Acute 2 cm deep white matter periventricular infarct along body of right lateral ventricle. Findings are in a small vessel disease distribution. 2. Mild changes of chronic small vessel disease in cerebral white matter. 3. 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 Please see CTA for further details. Dictated by (CST): Keyon Montano MD on 10/15/2023 at 11:10 AM       Finalized by (CST): Keyon Montano MD on 10/15/2023 at 11:16 AM     Patient/Family Goals: Pt reports slurred speech    Interdisciplinary Communication: Discussed with RN  Recommendations posted at bedside    Patient, family and/or caregiver has been informed and has taken part in this evaluation and plan of treatment and have been advised and agree on the findings and goals. FOLLOW UP  Treatment Plan/Recommendations: Aspiration precautions; Dysarthria therapy  Number of Visits to Meet Established Goals: 2  Follow Up Needed (Documentation Required): Yes  SLP Follow-up Date: 10/17/23    Thank you for your referral.  If you have any questions please contact KAREN Clay  Speech Language Pathologist  Phone Number Ext. [phone]

## 2023-10-16 NOTE — PLAN OF CARE
Left side remains flaccid. Problem: Patient Centered Care  Goal: Patient preferences are identified and integrated in the patient's plan of care  Description: Interventions:  - What would you like us to know as we care for you?   - Provide timely, complete, and accurate information to patient/family  - Incorporate patient and family knowledge, values, beliefs, and cultural backgrounds into the planning and delivery of care  - Encourage patient/family to participate in care and decision-making at the level they choose  - Honor patient and family perspectives and choices  Outcome: Progressing     Problem: Patient/Family Goals  Goal: Patient/Family Long Term Goal  Description: Patient's Long Term Goal:     Interventions:  -   - See additional Care Plan goals for specific interventions  Outcome: Progressing  Goal: Patient/Family Short Term Goal  Description: Patient's Short Term Goal:     Interventions:   -   - See additional Care Plan goals for specific interventions  Outcome: Progressing     Problem: NEUROLOGICAL - ADULT  Goal: Achieves stable or improved neurological status  Description: INTERVENTIONS  - Assess for and report changes in neurological status  - Initiate measures to prevent increased intracranial pressure  - Maintain blood pressure and fluid volume within ordered parameters to optimize cerebral perfusion and minimize risk of hemorrhage  - Monitor temperature, glucose, and sodium.  Initiate appropriate interventions as ordered  Outcome: Progressing  Goal: Achieves maximal functionality and self care  Description: INTERVENTIONS  - Monitor swallowing and airway patency with patient fatigue and changes in neurological status  - Encourage and assist patient to increase activity and self care with guidance from PT/OT  - Encourage visually impaired, hearing impaired and aphasic patients to use assistive/communication devices  Outcome: Progressing

## 2023-10-16 NOTE — PLAN OF CARE
Received pt from CCU. Patient is a/ox4, slurred speech. RA. Complete care. Neuro checks and vitals q4h. Daily NIH. Potassium replacement per protocol. Plan is acute rehab at discharge. Problem: Patient Centered Care  Goal: Patient preferences are identified and integrated in the patient's plan of care  Description: Interventions:  - What would you like us to know as we care for you? From [place]   - Provide timely, complete, and accurate information to patient/family  - Incorporate patient and family knowledge, values, beliefs, and cultural backgrounds into the planning and delivery of care  - Encourage patient/family to participate in care and decision-making at the level they choose  - Honor patient and family perspectives and choices  Outcome: Progressing     Problem: Patient/Family Goals  Goal: Patient/Family Long Term Goal  Description: Patient's Long Term Goal: go home    Interventions:  - PT/OT, med admin, follow physician orders, neuro checks  - See additional Care Plan goals for specific interventions  Outcome: Progressing  Goal: Patient/Family Short Term Goal  Description: Patient's Short Term Goal: get to rehab    Interventions:   - PT/OT, med admin, follow physician orders  - See additional Care Plan goals for specific interventions  Outcome: Progressing     Problem: NEUROLOGICAL - ADULT  Goal: Achieves stable or improved neurological status  Description: INTERVENTIONS  - Assess for and report changes in neurological status  - Initiate measures to prevent increased intracranial pressure  - Maintain blood pressure and fluid volume within ordered parameters to optimize cerebral perfusion and minimize risk of hemorrhage  - Monitor temperature, glucose, and sodium.  Initiate appropriate interventions as ordered  Outcome: Progressing  Goal: Achieves maximal functionality and self care  Description: INTERVENTIONS  - Monitor swallowing and airway patency with patient fatigue and changes in neurological status  - Encourage and assist patient to increase activity and self care with guidance from PT/OT  - Encourage visually impaired, hearing impaired and aphasic patients to use assistive/communication devices  Outcome: Progressing

## 2023-10-17 VITALS
OXYGEN SATURATION: 99 % | RESPIRATION RATE: 18 BRPM | HEIGHT: 72 IN | WEIGHT: 226.69 LBS | BODY MASS INDEX: 30.7 KG/M2 | DIASTOLIC BLOOD PRESSURE: 85 MMHG | TEMPERATURE: 99 F | SYSTOLIC BLOOD PRESSURE: 160 MMHG | HEART RATE: 82 BPM

## 2023-10-17 LAB
ANION GAP SERPL CALC-SCNC: 6 MMOL/L (ref 0–18)
BASOPHILS # BLD AUTO: 0.04 X10(3) UL (ref 0–0.2)
BASOPHILS NFR BLD AUTO: 0.7 %
BUN BLD-MCNC: 10 MG/DL (ref 7–18)
BUN/CREAT SERPL: 11 (ref 10–20)
CALCIUM BLD-MCNC: 8.6 MG/DL (ref 8.5–10.1)
CHLORIDE SERPL-SCNC: 107 MMOL/L (ref 98–112)
CO2 SERPL-SCNC: 30 MMOL/L (ref 21–32)
CREAT BLD-MCNC: 0.91 MG/DL
DEPRECATED RDW RBC AUTO: 46.7 FL (ref 35.1–46.3)
EGFRCR SERPLBLD CKD-EPI 2021: 94 ML/MIN/1.73M2 (ref 60–?)
EOSINOPHIL # BLD AUTO: 0.08 X10(3) UL (ref 0–0.7)
EOSINOPHIL NFR BLD AUTO: 1.4 %
ERYTHROCYTE [DISTWIDTH] IN BLOOD BY AUTOMATED COUNT: 13.8 % (ref 11–15)
GLUCOSE BLD-MCNC: 125 MG/DL (ref 70–99)
HCT VFR BLD AUTO: 42.1 %
HGB BLD-MCNC: 13.5 G/DL
IMM GRANULOCYTES # BLD AUTO: 0.01 X10(3) UL (ref 0–1)
IMM GRANULOCYTES NFR BLD: 0.2 %
LYMPHOCYTES # BLD AUTO: 1.68 X10(3) UL (ref 1–4)
LYMPHOCYTES NFR BLD AUTO: 30.4 %
MAGNESIUM SERPL-MCNC: 2.1 MG/DL (ref 1.6–2.6)
MCH RBC QN AUTO: 29.6 PG (ref 26–34)
MCHC RBC AUTO-ENTMCNC: 32.1 G/DL (ref 31–37)
MCV RBC AUTO: 92.3 FL
MONOCYTES # BLD AUTO: 0.33 X10(3) UL (ref 0.1–1)
MONOCYTES NFR BLD AUTO: 6 %
NEUTROPHILS # BLD AUTO: 3.39 X10 (3) UL (ref 1.5–7.7)
NEUTROPHILS # BLD AUTO: 3.39 X10(3) UL (ref 1.5–7.7)
NEUTROPHILS NFR BLD AUTO: 61.3 %
OSMOLALITY SERPL CALC.SUM OF ELEC: 297 MOSM/KG (ref 275–295)
PLATELET # BLD AUTO: 167 10(3)UL (ref 150–450)
POTASSIUM SERPL-SCNC: 3.6 MMOL/L (ref 3.5–5.1)
POTASSIUM SERPL-SCNC: 3.6 MMOL/L (ref 3.5–5.1)
POTASSIUM SERPL-SCNC: 3.9 MMOL/L (ref 3.5–5.1)
RBC # BLD AUTO: 4.56 X10(6)UL
SODIUM SERPL-SCNC: 143 MMOL/L (ref 136–145)
WBC # BLD AUTO: 5.5 X10(3) UL (ref 4–11)

## 2023-10-17 RX ORDER — CLOPIDOGREL BISULFATE 75 MG/1
75 TABLET ORAL DAILY
Qty: 20 TABLET | Refills: 0 | Status: SHIPPED | OUTPATIENT
Start: 2023-10-17 | End: 2023-11-06

## 2023-10-17 RX ORDER — POTASSIUM CHLORIDE 20 MEQ/1
40 TABLET, EXTENDED RELEASE ORAL EVERY 4 HOURS
Status: COMPLETED | OUTPATIENT
Start: 2023-10-17 | End: 2023-10-17

## 2023-10-17 RX ORDER — ATORVASTATIN CALCIUM 80 MG/1
80 TABLET, FILM COATED ORAL NIGHTLY
Qty: 90 TABLET | Refills: 1 | Status: SHIPPED | OUTPATIENT
Start: 2023-10-17 | End: 2024-04-14

## 2023-10-17 RX ORDER — ASPIRIN 81 MG/1
81 TABLET, CHEWABLE ORAL DAILY
Status: DISCONTINUED | OUTPATIENT
Start: 2023-10-17 | End: 2023-10-17

## 2023-10-17 RX ORDER — ASPIRIN 81 MG/1
81 TABLET, CHEWABLE ORAL DAILY
Qty: 90 TABLET | Refills: 3 | Status: SHIPPED | OUTPATIENT
Start: 2023-10-17 | End: 2024-10-16

## 2023-10-17 NOTE — CM/SW NOTE
SORAYA discussed w/ Dr. Felix Zhou - confirmed PMR saw pt yesterday and recommended Acute Rehab. MD believes pt chose [facility] but no SW/CM note indicating this. Met w/ pt at bedside - confirmed he is aware of Acute Rehab recommendation and he has chosen [facility] reserved in [place]. Notified liaison Fili Lockwood via [email or web address]. Requested they submit insurance auth. PLAN: [facility] - pending ins auth      SW/CM to remain available for support and/or discharge planning.          Freda Arredondo, MSW, [facility]

## 2023-10-17 NOTE — DISCHARGE PLANNING
I called and gave report to nurse Lauren Hairston at [facility] rehab facility. Patient's physical and history were relayed to nursing staff and included past medical history & diagnosis of CVA. Patient will be discharging at 5pm as arranged by social work/case management.

## 2023-10-17 NOTE — CM/SW NOTE
10/17/23 1455   Discharge disposition   Expected discharge disposition Rehab [address] Provider [facility]   Discharge transportation [facility]       Received notice from [facility] - insurance auth received and bed/room assignment available. Notified them of transport ETA for 5PM.    RN/Evangelina and SHIRA RN Lexi notified. RN to update pt/family at bedside. MD notified via [employer].     SW spoke to [employer] w/ [facility] (max assist) set for 5PM. PCS completed in Epic - pt's RN to print w/ pt's AVS.    Room #: 1963  Report phone #: [phone]    PLAN: [facility] [address] completed        Yisel Avendano MSW, [address] [facility]

## 2023-10-17 NOTE — PHYSICAL THERAPY NOTE
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 Functional Mobility: Gait belt used throughout mobility. - bed mobility: supine to/from sit NT up in chair    - transfers: sit to/from stand transfers with mod A for x 6 reps of STS using bedrail to pull up, L side knee blocking and hand placement support due to hemiparesis   - Standing: initial L lean but able to self correct at bar to CGA for 60-90 second intervals of standing 6 reps; toe taps and lateral weight shift with min A to tolerance, blocking of LLE to prevent buckling   - gait: unable to trial   - seated AAROM education for LE activation including AAROM SLR with R LE support and shoulder flexion with R UE support      At end of session patient in chair, UE supported, spouse at bedside with all needs in reach, RN aware. The patient's Approx Degree of Impairment: 72.57% has been calculated based on documentation in the [facility] '6 clicks' Inpatient Basic Mobility Short Form. Research supports that patients with this level of impairment may benefit from [facility], recommend acute rehab as pt is highly motivated and progressing with inpatient therapy. DISCHARGE RECOMMENDATIONS  PT Discharge Recommendations: Acute rehabilitation (pt is highly motivated to participate)     PLAN  PT Treatment Plan: Bed mobility; Body mechanics; Patient education;Stair training;Transfer training;Balance training;Strengthening;Gait training;Neuromuscular re-educate  Frequency (Obs): Daily    SUBJECTIVE  \"I want to get stronger. \"   \"I feel trapped in my own body. \" OBJECTIVE  Precautions: Bed/chair alarm;Limb alert - left    WEIGHT BEARING RESTRICTION      No restrictions           PAIN ASSESSMENT   Ratin          BALANCE  Static Sitting: Fair +  Dynamic Sitting: Fair  Static Standing: Poor +  Dynamic Standing: Poor + (with LLE blocking)      AM-PAC '6-Clicks' INPATIENT SHORT FORM - BASIC MOBILITY  How much difficulty does the patient currently have. .. Patient Difficulty: Turning over in bed (including adjusting bedclothes, sheets and blankets)?: A Lot   Patient Difficulty: Sitting down on and standing up from a chair with arms (e.g., wheelchair, bedside commode, etc.): A Lot   Patient Difficulty: Moving from lying on back to sitting on the side of the bed?: A Lot   How much help from another person does the patient currently need. .. Help from Another: Moving to and from a bed to a chair (including a wheelchair)?: A Lot   Help from Another: Need to walk in hospital room?: A Lot   Help from Another: Climbing 3-5 steps with a railing?: Total     AM-PAC Score:  Raw Score: 11   Approx Degree of Impairment: 72.57%   Standardized Score (AM-PAC Scale): 33.86   CMS Modifier (G-Code): CL    FUNCTIONAL ABILITY STATUS  Functional Mobility/Gait Assessment  Gait Assistance: Not tested      Patient End of Session: Up in chair;Needs met;Call light within reach;RN aware of session/findings; All patient questions and concerns addressed; Alarm set; Family present    CURRENT GOALS   Goals to be met by: 10/25/23  Patient Goal Patient's self-stated goal is: to go home   Goal #1 Patient is able to demonstrate supine - sit EOB @ level: minimum assistance      Goal #1   Current Status  in progress    Goal #2 Patient is able to demonstrate transfers Sit to/from Stand at assistance level: minimum assistance with walker - rolling      Goal #2  Current Status  in progress    Goal #3 Patient is able to ambulate 10 feet with assist device: walker - rolling at assistance level: minimum assistance Goal #3   Current Status  in progress    Goal #4     Goal #4   Current Status     Goal #5 Patient to demonstrate independence with home activity/exercise instructions provided to patient in preparation for discharge.    Goal #5   Current Status  in progress    Goal #6     Goal #6  Current Status         Theract 15 min   Neuro-leila 15 min

## 2023-10-17 NOTE — PLAN OF CARE
Alert and oriented x4 on room air. Neuro and vitals Q4H. No changes overnight - pt's sensation is intact bilaterally upper and lower extremities but pt continues to be unable to move LUE or move foot. Pt is able to slowly bend at the L knee. Able to move RUE and RLE. Voiding via primofit. Per orders, BP range maintained. Call light within reach. Problem: Patient Centered Care  Goal: Patient preferences are identified and integrated in the patient's plan of care  Description: Interventions:  - What would you like us to know as we care for you? From [place] w/ wife  - Provide timely, complete, and accurate information to patient/family  - Incorporate patient and family knowledge, values, beliefs, and cultural backgrounds into the planning and delivery of care  - Encourage patient/family to participate in care and decision-making at the level they choose  - Honor patient and family perspectives and choices  Outcome: Progressing     Problem: Patient/Family Goals  Goal: Patient/Family Long Term Goal  Description: Patient's Long Term Goal: go home    Interventions:  - PT/OT, med admin, follow physician orders, neuro checks  - See additional Care Plan goals for specific interventions  Outcome: Progressing  Goal: Patient/Family Short Term Goal  Description: Patient's Short Term Goal: get to rehab    Interventions:   - PT/OT, med admin, follow physician orders  - See additional Care Plan goals for specific interventions  Outcome: Progressing     Problem: NEUROLOGICAL - ADULT  Goal: Achieves stable or improved neurological status  Description: INTERVENTIONS  - Assess for and report changes in neurological status  - Initiate measures to prevent increased intracranial pressure  - Maintain blood pressure and fluid volume within ordered parameters to optimize cerebral perfusion and minimize risk of hemorrhage  - Monitor temperature, glucose, and sodium.  Initiate appropriate interventions as ordered  Outcome: Progressing  Goal: Achieves maximal functionality and self care  Description: INTERVENTIONS  - Monitor swallowing and airway patency with patient fatigue and changes in neurological status  - Encourage and assist patient to increase activity and self care with guidance from PT/OT  - Encourage visually impaired, hearing impaired and aphasic patients to use assistive/communication devices  Outcome: Progressing

## 2023-10-17 NOTE — DISCHARGE INSTRUCTIONS
A Stroke is when part of your brain does not get blood because of blockage in an artery, leading to that part of the brain dying. This is known as an ischemic stroke, because the brain does not get enough blood. To lower your risk of another stroke, you need to take your Aspirin 81 mg daily as prescribed. You will take clopidogrel (Plavix) 75 mg daily for 21 days only to further reduce your risk of stroke at that time. After 21 days stop the medication. If for any reason you have difficulty taking the medication as directed, please call our office and explain your difficulty. Do not stop taking your medications until you have been instructed to do so as this can increase your stroke risk. Risk factors that can be controlled with medications and lifestyle changes include:     High blood pressure (hypertension): It is recommended that your blood pressure be less than 130/80 to lower your risk of stroke. Check your blood pressure twice a day. Once in the morning, right when you wake up and before you take your morning medications. Check it again once in the evening after you have taken any evening medications. Write down these values for 2 weeks. Bring them to your primary care physician and your next stroke follow-up visit. High cholesterol: It is recommended that your low-density lipoprotein cholesterol (LDL-C) should be less than 70. You have been prescribed atorvastatin to be taken at bedtime to manage your cholesterol. Diabetes: To prevent further strokes, your fasting blood sugar should be , and your HbA1c should be less than 6.5. Smoking: It is recommended that you ABSTAIN from smoking. If necessary, there are resources available to assist you with smoking cessation. If you smoke or use tobacco products, discuss alternatives with your doctor.   [facility] Tobacco Quit Line: [phone]  [place] Tobacco Quit Line: 6-564-098-259-940-9361     Diet: We recommend the [address] diet -- high in fresh fruits (apples, blueberries) and vegetables (dark green such as spinach, kale). Fish and nuts (walnuts, almonds), olive oil or canola oil. Reduced red meat, cheese/dairy, and eggs. Also recommended is moderate sodium intake. Sedentary lifestyle: Try to engage in routine exercise (3-5 sessions of aerobic exercise per week, 30 minutes per session). Talk with your primary care physician about what type of exercise might be best for you. Snoring: If snoring, consider referral for possible obstructive sleep apnea (SADIQ). Treating obstructive sleep apnea will decrease your risk of developing dementia, can lower your blood pressure, will improve your energy levels, may treat headache, and may decrease your risk of stroke. Stroke symptoms include the following, and 911 should be called immediately if you experience any of these:       B.E. F.A.S.T.    B: Balance-- sudden loss  of balance, staggering gait, severe vertigo        E: Eyes-- sudden loss of vision in one or both eyes, onset of double vision        F: Face-- uneven or drooping face, drooling, ask the patient to smile        A: Arm (leg)-- loss of strength or sensation on one side of the body in the arm and/or leg        S: Speech-- slurring of speech, difficulty  saying words or understanding what is being said, sudden confusion        T: Terrible headache (time*)-- very severe headache which has maximum intensity within seconds to a minute        * Time of symptom onset and last known well times are important when determining what treatment is appropriate for an individual's stroke, particularly since treatment is time limited. Time is not a symptom or sign of stroke. Traditionally, Time was used for the \"T\" in the FAST and BEFAST acronyms for stroke awareness. Since terrible headache can be a symptom of stroke this is substituted.   However, as the acronym B.E. F.A.S.T. suggests, acting quickly is of critical importance after stroke is suspected. Knowing the symptom onset time or time when last well will have an impact on what treatments can be offered safely.

## 2023-10-17 NOTE — PLAN OF CARE
Patient sitting up in chair throughout the day. Alert & orientedx4. On room air. Patient aware and agreeable to plan for discharge this evening to [facility]. Fall precautions in place. Call light in reach. Problem: Patient Centered Care  Goal: Patient preferences are identified and integrated in the patient's plan of care  Description: Interventions:  - What would you like us to know as we care for you? From home with wife  - Provide timely, complete, and accurate information to patient/family  - Incorporate patient and family knowledge, values, beliefs, and cultural backgrounds into the planning and delivery of care  - Encourage patient/family to participate in care and decision-making at the level they choose  - Honor patient and family perspectives and choices  Outcome: Adequate for Discharge     Problem: Patient/Family Goals  Goal: Patient/Family Long Term Goal  Description: Patient's Long Term Goal: go home    Interventions:  - PT/OT, med admin, follow physician orders, neuro checks  - See additional Care Plan goals for specific interventions  Outcome: Adequate for Discharge  Goal: Patient/Family Short Term Goal  Description: Patient's Short Term Goal: get to rehab    Interventions:   - PT/OT, med admin, follow physician orders  - See additional Care Plan goals for specific interventions  Outcome: Adequate for Discharge     Problem: NEUROLOGICAL - ADULT  Goal: Achieves stable or improved neurological status  Description: INTERVENTIONS  - Assess for and report changes in neurological status  - Initiate measures to prevent increased intracranial pressure  - Maintain blood pressure and fluid volume within ordered parameters to optimize cerebral perfusion and minimize risk of hemorrhage  - Monitor temperature, glucose, and sodium.  Initiate appropriate interventions as ordered  Outcome: Adequate for Discharge  Goal: Achieves maximal functionality and self care  Description: INTERVENTIONS  - Monitor swallowing and airway patency with patient fatigue and changes in neurological status  - Encourage and assist patient to increase activity and self care with guidance from PT/OT  - Encourage visually impaired, hearing impaired and aphasic patients to use assistive/communication devices  Outcome: Adequate for Discharge

## 2023-10-18 LAB
EST. AVERAGE GLUCOSE BLD GHB EST-MCNC: 134 MG/DL (ref 68–126)
HBA1C MFR BLD: 6.3 % (ref ?–5.7)

## 2023-10-24 ENCOUNTER — TELEPHONE (OUTPATIENT)
Dept: ADMINISTRATIVE | Age: 63
End: 2023-10-24

## 2023-10-24 NOTE — TELEPHONE ENCOUNTER
Fmla/Disab and Rtw received in forms dept. Logged for processing. Called and lvm informing pt Pamela Garcia is needed.

## 2023-10-25 NOTE — TELEPHONE ENCOUNTER
Patient called back in regards to auth- Per patient he is recovering from a stroke and is a rehab center - He doesn't think he can give auth physically or electronically. Says he will like a cb with an alternative way for him to give auth.

## 2023-11-13 NOTE — TELEPHONE ENCOUNTER
Called Shakeel  returning call no answer Mitchell hay couldn't LMTCBLois Maldonado Reveal return her call in regards to patient forms.  Per klever they are working on all patient medical leave forms and will like any forms we have of patient to be faxed to her at [phone]

## 2023-12-27 ENCOUNTER — TELEPHONE (OUTPATIENT)
Dept: NEUROLOGY | Facility: CLINIC | Age: 63
End: 2023-12-27

## 2023-12-27 NOTE — TELEPHONE ENCOUNTER
We received short term disability forms today via fax from [phone] . Patient forms has been emailed and  a copy has been sent to Stef Rainier Raghav through our inner office mail to be processed.

## 2023-12-27 NOTE — TELEPHONE ENCOUNTER
Called patient to inform him that we received forms and the medical leave forms department will be processing them and reaching out to him.  Patient acknowledge understanding and thanked us for calling

## 2023-12-28 NOTE — TELEPHONE ENCOUNTER
Pt forms were all placed on hold. He was supposed to schedule an appt. Appt on 11/28/23 was canceled. Forms cannot be processed until pt is seen. Pt is aware of this. States he sees a neuro in [address] [facility] and sees a dr. Naun Garcia. He is aware to have all forms forwarded to them. Forms archived.